# Patient Record
Sex: MALE | Race: WHITE | Employment: FULL TIME | URBAN - METROPOLITAN AREA
[De-identification: names, ages, dates, MRNs, and addresses within clinical notes are randomized per-mention and may not be internally consistent; named-entity substitution may affect disease eponyms.]

---

## 2018-01-29 ENCOUNTER — THERAPY VISIT (OUTPATIENT)
Dept: PHYSICAL THERAPY | Facility: CLINIC | Age: 23
End: 2018-01-29
Payer: COMMERCIAL

## 2018-01-29 DIAGNOSIS — M54.2 NECK PAIN: Primary | ICD-10-CM

## 2018-01-29 DIAGNOSIS — M54.6 PAIN IN THORACIC SPINE: ICD-10-CM

## 2018-01-29 PROCEDURE — 97110 THERAPEUTIC EXERCISES: CPT | Mod: GP | Performed by: PHYSICAL THERAPIST

## 2018-01-29 PROCEDURE — 97161 PT EVAL LOW COMPLEX 20 MIN: CPT | Mod: GP | Performed by: PHYSICAL THERAPIST

## 2018-01-29 PROCEDURE — 97140 MANUAL THERAPY 1/> REGIONS: CPT | Mod: GP | Performed by: PHYSICAL THERAPIST
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
## 2018-01-29 NOTE — MR AVS SNAPSHOT
"              After Visit Summary   2018    Baldev Gonzalez    MRN: [id number]           Patient Information     Date Of Birth          1995        Visit Information        Provider Department      2018 12:10 PM Derek Underwood, PT [facility] PT        Today's Diagnoses     Neck pain    -  1    Pain in thoracic spine           Follow-ups after your visit        Who to contact     If you have questions or need follow up information about today's clinic visit or your schedule please contact [facility] ATHLETIC [facility] PT directly at [phone].  Normal or non-critical lab and imaging results will be communicated to you by DTThart, letter or phone within 4 business days after the clinic has received the results. If you do not hear from us within 7 days, please contact the clinic through Wingut or phone. If you have a critical or abnormal lab result, we will notify you by phone as soon as possible.  Submit refill requests through [employer] or call your pharmacy and they will forward the refill request to us. Please allow 3 business days for your refill to be completed.          Additional Information About Your Visit        MyChart Information     [employer] lets you send messages to your doctor, view your test results, renew your prescriptions, schedule appointments and more. To sign up, go to [email or web address] . Click on \"Log in\" on the left side of the screen, which will take you to the Welcome page. Then click on \"Sign up Now\" on the right side of the page.     You will be asked to enter the access code listed below, as well as some personal information. Please follow the directions to create your username and password.     Your access code is: [email or web address]  Expires: 2018  1:48 PM     Your access code will  in 90 days. If you need help or a new code, please call your [place] clinic or [phone].        Care EveryWhere ID     This " is your Care EveryWhere ID. This could be used by other organizations to access your [place] medical records  [id number]         Blood Pressure from Last 3 Encounters:   12/10/15 116/69   07/10/15 130/78   11/15/13 127/79    Weight from Last 3 Encounters:   12/10/15 77.9 kg (171 lb 12.8 oz)   07/10/15 81.9 kg (180 lb 9.6 oz)              We Performed the Following     HC PT EVAL, LOW COMPLEXITY     ROLAND INITIAL EVAL REPORT     MANUAL THER TECH,1+REGIONS,EA 15 MIN     THERAPEUTIC EXERCISES        Primary Care Provider Office Phone # Fax #    Flor León, APRN [facility] [phone]       [address] [facility] [address]  [facility] [address]        Equal Access to Services     DELMA SANTANA : Hadii aad ku hadasho Soomaali, waaxda luqadaha, qaybta kaalmada adeegyada, waxay idiin haybandarn serena nelson . So [facility] [phone].    ATENCIÓN: Si habla español, tiene a whitney disposición servicios gratuitos de asistencia lingüística. Llame al [phone].    We comply with applicable federal civil rights laws and [place] laws. We do not discriminate on the basis of race, color, national origin, age, disability, sex, sexual orientation, or gender identity.            Thank you!     Thank you for choosing [facility]  for your care. Our goal is always to provide you with excellent care. Hearing back from our patients is one way we can continue to improve our services. Please take a few minutes to complete the written survey that you may receive in the mail after your visit with us. Thank you!             Your Updated Medication List - Protect others around you: Learn how to safely use, store and throw away your medicines at www.disposemymeds.org.          This list is accurate as of 1/29/18  1:48 PM.  Always use your most recent med list.                   Brand Name Dispense Instructions for use Diagnosis    buPROPion 300 MG 24 hr tablet    WELLBUTRIN XL    150 tablet    Take  1 tablet (300 mg) by mouth every morning    Major depressive disorder, recurrent episode, moderate (H)       hydrOXYzine 25 MG tablet    ATARAX    60 tablet    Take 1-2 tablets (25-50 mg) by mouth every 6 hours as needed for anxiety    ROE (generalized anxiety disorder)       lamoTRIgine 200 MG tablet    LaMICtal    150 tablet    Take 1 tablet (200 mg) by mouth daily    Major depressive disorder, recurrent episode, moderate (H)
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
## 2018-02-06 ENCOUNTER — THERAPY VISIT (OUTPATIENT)
Dept: PHYSICAL THERAPY | Facility: CLINIC | Age: 23
End: 2018-02-06
Payer: COMMERCIAL

## 2018-02-06 DIAGNOSIS — M54.2 NECK PAIN: ICD-10-CM

## 2018-02-06 DIAGNOSIS — M54.6 PAIN IN THORACIC SPINE: ICD-10-CM

## 2018-02-06 PROCEDURE — 97140 MANUAL THERAPY 1/> REGIONS: CPT | Mod: GP | Performed by: PHYSICAL THERAPIST

## 2018-02-06 PROCEDURE — 97110 THERAPEUTIC EXERCISES: CPT | Mod: GP | Performed by: PHYSICAL THERAPIST

## 2018-02-06 NOTE — MR AVS SNAPSHOT
"              After Visit Summary   2018    Baldev Gonzalez    MRN: [id number]           Patient Information     Date Of Birth          1995        Visit Information        Provider Department      2018 3:00 PM Derek Underwood, PT [facility] PT        Today's Diagnoses     Neck pain        Pain in thoracic spine           Follow-ups after your visit        Who to contact     If you have questions or need follow up information about today's clinic visit or your schedule please contact [facility] ATHLETIC [facility] PT directly at [phone].  Normal or non-critical lab and imaging results will be communicated to you by ROBAUTOhart, letter or phone within 4 business days after the clinic has received the results. If you do not hear from us within 7 days, please contact the clinic through Booktropet or phone. If you have a critical or abnormal lab result, we will notify you by phone as soon as possible.  Submit refill requests through [employer] or call your pharmacy and they will forward the refill request to us. Please allow 3 business days for your refill to be completed.          Additional Information About Your Visit        MyChart Information     [employer] lets you send messages to your doctor, view your test results, renew your prescriptions, schedule appointments and more. To sign up, go to [email or web address] . Click on \"Log in\" on the left side of the screen, which will take you to the Welcome page. Then click on \"Sign up Now\" on the right side of the page.     You will be asked to enter the access code listed below, as well as some personal information. Please follow the directions to create your username and password.     Your access code is: [email or web address]  Expires: 2018  1:48 PM     Your access code will  in 90 days. If you need help or a new code, please call your [place] clinic or [phone].        Care EveryWhere ID     This is " your Care EveryWhere ID. This could be used by other organizations to access your [place] medical records  [id number]         Blood Pressure from Last 3 Encounters:   12/10/15 116/69   07/10/15 130/78   11/15/13 127/79    Weight from Last 3 Encounters:   12/10/15 77.9 kg (171 lb 12.8 oz)   07/10/15 81.9 kg (180 lb 9.6 oz)              We Performed the Following     Manual Ther Tech, 1+Regions, EA 15 min     Therapeutic Exercises        Primary Care Provider Office Phone # Fax #    Flor León, APRN [phone] [address] [facility] [address]  [facility] [address]        Equal Access to Services     DELMA SANTANA : Hadii aad ku hadasho Soomaali, waaxda luqadaha, qaybta kaalmada adeegyada, cathy ocampo haysalma nelson . So [facility] [phone].    ATENCIÓN: Si habla español, tiene a whitney disposición servicios gratuitos de asistencia lingüística. Llame al [phone].    We comply with applicable federal civil rights laws and [place] laws. We do not discriminate on the basis of race, color, national origin, age, disability, sex, sexual orientation, or gender identity.            Thank you!     Thank you for choosing [facility]  for your care. Our goal is always to provide you with excellent care. Hearing back from our patients is one way we can continue to improve our services. Please take a few minutes to complete the written survey that you may receive in the mail after your visit with us. Thank you!             Your Updated Medication List - Protect others around you: Learn how to safely use, store and throw away your medicines at www.disposemymeds.org.          This list is accurate as of 2/6/18  4:15 PM.  Always use your most recent med list.                   Brand Name Dispense Instructions for use Diagnosis    buPROPion 300 MG 24 hr tablet    WELLBUTRIN XL    150 tablet    Take 1 tablet (300 mg) by mouth every morning    Major depressive  disorder, recurrent episode, moderate (H)       hydrOXYzine 25 MG tablet    ATARAX    60 tablet    Take 1-2 tablets (25-50 mg) by mouth every 6 hours as needed for anxiety    ROE (generalized anxiety disorder)       lamoTRIgine 200 MG tablet    LaMICtal    150 tablet    Take 1 tablet (200 mg) by mouth daily    Major depressive disorder, recurrent episode, moderate (H)

## 2018-02-06 NOTE — LETTER
[place] [facility]  [address]  [facility] [address]  [phone]    2018    Re: Baldev Gonzalez   :   1995  MRN:  [id number]   REFERRING PHYSICIAN:   Unruly Romano  [facility]  Date of Initial Evaluation: 18  Visits: 2 Rxs Used: 2  Reason for Referral:     Neck pain  Pain in thoracic spine  Assessment/Plan:    DISCHARGE REPORT  Progress reporting period is from 2018 to 2018.   SUBJECTIVE  Subjective: Better than it was. Hasn't really been getting headaches the past few days. Has a little bit of pain with cervical rotation to the right. Reports that traction feels good.   Current Pain level: 2/10   Initial Pain level: 2/10   Changes in function: No changes noted in function since last SOAP note   Adverse reactions: None;   ,     Patient has failed to return to therapy so current objective findings are unknown.  The subjective and objective information are from the last SOAP note on this patient.  OBJECTIVE  Objective: slightly limited cervical rotation to the right    ASSESSMENT/PLAN  Updated problem list and treatment plan:Diagnosis 1:  C/T poain  Pain -  manual therapy, self management, education, directional preference exercise and home program  Decreased ROM/flexibility - manual therapy and therapeutic exercise  Decreased strength - therapeutic exercise and therapeutic activities  Decreased function - therapeutic activities      STG/LTGs have been met or progress has been made towards goals:  unknown  Assessment of Progress: The patient has not returned to therapy. Current status is unknown.  Self Management Plans:  Patient has been instructed in a home treatment program.    Baldev continues to require the following intervention to meet STG and LTG's: PT intervention is no longer required to meet STG/LTG.  The patient failed to complete scheduled/ordered appointments so current information is  unknown.  We will discharge this patient from PT.    Re: Baldev Gonzalez   :   1995        Recommendations:  Pt did not schedule any additional visits.  Discharge pt from PT.      Thank you for your referral.    INQUIRIES  Therapist: Derek Underwood PT   [facility]  [address] [facility] [address]  Phone: [phone]  Fax: [phone]

## 2018-07-17 PROBLEM — M54.2 NECK PAIN: Status: RESOLVED | Noted: 2018-01-29 | Resolved: 2018-07-17

## 2018-07-17 PROBLEM — M54.6 PAIN IN THORACIC SPINE: Status: RESOLVED | Noted: 2018-01-29 | Resolved: 2018-07-17

## 2018-07-17 NOTE — PROGRESS NOTES
Subjective:  HPI                    Objective:  System    Physical Exam    General     ROS    Assessment/Plan:    DISCHARGE REPORT    Progress reporting period is from 1/29/2018 to 2/6/2018.     SUBJECTIVE  Subjective: Better than it was. Hasn't really been getting headaches the past few days. Has a little bit of pain with cervical rotation to the right. Reports that traction feels good.   Current Pain level: 2/10   Initial Pain level: 2/10   Changes in function: No changes noted in function since last SOAP note   Adverse reactions: None;   ,     Patient has failed to return to therapy so current objective findings are unknown.  The subjective and objective information are from the last SOAP note on this patient.    OBJECTIVE  Objective: slightly limited cervical rotation to the right      ASSESSMENT/PLAN  Updated problem list and treatment plan:Diagnosis 1:  C/T poain  Pain -  manual therapy, self management, education, directional preference exercise and home program  Decreased ROM/flexibility - manual therapy and therapeutic exercise  Decreased strength - therapeutic exercise and therapeutic activities  Decreased function - therapeutic activities      STG/LTGs have been met or progress has been made towards goals:  unknown  Assessment of Progress: The patient has not returned to therapy. Current status is unknown.  Self Management Plans:  Patient has been instructed in a home treatment program.    Baldev continues to require the following intervention to meet STG and LTG's: PT intervention is no longer required to meet STG/LTG.  The patient failed to complete scheduled/ordered appointments so current information is unknown.  We will discharge this patient from PT.    Recommendations:  Pt did not schedule any additional visits.  Discharge pt from PT.      Please refer to the daily flowsheet for treatment today, total treatment time and time spent performing 1:1 timed codes.